# Patient Record
Sex: FEMALE | Race: WHITE | Employment: UNEMPLOYED | URBAN - METROPOLITAN AREA
[De-identification: names, ages, dates, MRNs, and addresses within clinical notes are randomized per-mention and may not be internally consistent; named-entity substitution may affect disease eponyms.]

---

## 2021-12-06 ENCOUNTER — TRANSFERRED RECORDS (OUTPATIENT)
Dept: PHYSICAL THERAPY | Facility: CLINIC | Age: 61
End: 2021-12-06
Payer: COMMERCIAL

## 2021-12-23 ENCOUNTER — THERAPY VISIT (OUTPATIENT)
Dept: PHYSICAL THERAPY | Facility: CLINIC | Age: 61
End: 2021-12-23
Payer: COMMERCIAL

## 2021-12-23 DIAGNOSIS — M54.50 ACUTE BILATERAL LOW BACK PAIN WITHOUT SCIATICA: ICD-10-CM

## 2021-12-23 PROCEDURE — 97110 THERAPEUTIC EXERCISES: CPT | Mod: GP | Performed by: PHYSICAL THERAPIST

## 2021-12-23 PROCEDURE — 97161 PT EVAL LOW COMPLEX 20 MIN: CPT | Mod: GP | Performed by: PHYSICAL THERAPIST

## 2021-12-23 NOTE — LETTER
[facility]  [address] [address] [place] [id number]  [phone]    2021    Re: Deonna Khan   :   1960  MRN:  [id number]   REFERRING PHYSICIAN:   Janet HERNANDES [facility]    Date of Initial Evaluation:  2021  Visits:  Rxs Used: 1  Reason for Referral:  Acute bilateral low back pain without sciatica    EVALUATION SUMMARY    [facility] Initial Evaluation    Subjective:  Deonna Khan is a 61 year old female.    Patient s chief complaints: Low Back Pain.    Condition occurred due to gradual onset noting pain with prolonged standing.  Date of Onset: pain worsened about 3 months ago, around 21  Location of symptoms is central and B low back pain without radiation .  Symptoms other than pain include: denies change in numbness/tingling/weakness; constipation at times, unsure if related   Quality of pain is aching and frequency is intermittent.    Pain dependence on time of day is: not dependent on time of day.   Pain rating is: 2/10.    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    Symptoms are relieved by:  sitting.    Progression of symptoms is that symptoms are:  unchanged.  Imaging/Special tests include: MRI next week, pending   Previous treatments include: none, had surgery and PT in more distant past .   Patient reports that general health is: good.   Pertinent medical history includes:  Diabetes II, osteopenia.  Sinus infection, oral sore.  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    LUMBAR:  Posture: fair  Posture Correction: better with upright posture  Relevant Lateral Shift: none   Deonna FARAH [facility]   :   1960 Pg 2    Neurological:    Motor Deficit:  Myotomes L R   L1-2 (hip flexion) 5 5   L3 (knee extension) 5 5   L4 (ankle DF) 5 5   L5 (g. toe ext) 5 5   S1 (ankle PF or knee flex) 5 5     Sensory Deficit, Reflexes: intact light touch screen B LE dermatomes    Dural Signs:   L R   Slump negative negative   SLR negative negative       AROM: (Major, Moderate, Minimal or Nil loss)  Movement Loss Conrado Mod Min Nil Pain   Flexion   X  To ankles, just stiff   Extension   X  Just stiff   Side Gliding L    X No effect   Side Gliding R    X No effect     Repeated movement testing:   (During: produces, abolishes, increases, decreases, no effect, centralizing, peripheralizing; After: better, worse, no better, no worse, no effect, centralized, peripheralized)    Pre-test Symptoms Standing: no pain   Symptoms During Symptoms After ROM increased ROM decreased No Effect   FIS        Rep FIS No effect No effect   X   EIS        Rep EIS No effect No effect   X   Pre-test Symptoms Lying: no pain    Symptoms During Symptoms After ROM increased ROM decreased No Effect   SYLVIE        Rep SYLVIE No effect No effect   X   EIL        Rep EIL No effect No effect   X     Static Tests: prone lying without pain, GWEN x 2 minutes without pain.  Other Tests: hip PROM and FADIR/PORTIA negative for pain both sides.  Negative SIJ compression/distraction, thigh thrust.      Deonna FARAH [facility]   :   1960 Pg 3    Provisional Classification: other, mechanically inconclusive.  Will trial flexion exercises secondary to complaint with prolonged standing and history of stenosis pre surgery.  Will have MRI upcoming, but will test for symptom response with repeated flexion  Principle of Management: flexion in sitting or lying 5 x 10 seconds every 2-3 hours 3-4x/day  or more; neutral spine core strength.  Will monitor response and progress core and LE strength as indicated.    Assessment/Plan:    Patient is a 61 year old female with lumbar complaints.    Patient has the following significant findings with corresponding treatment plan.                Diagnosis 1:  Low Back Pain    Pain -  hot/cold therapy, US, electric stimulation, manual therapy and directional preference exercise  Decreased ROM/flexibility - manual therapy and therapeutic exercise  Decreased strength - therapeutic exercise and therapeutic activities  Impaired balance - neuro re-education and therapeutic activities  Decreased proprioception - neuro re-education and therapeutic activities  Edema - electric stimulation and cold therapy  Impaired gait - gait training  Decreased function - therapeutic activities  Impaired posture - neuro re-education    Therapy Evaluation Codes:   1) History comprised of:   Personal factors that impact the plan of care:      None.    Comorbidity factors that impact the plan of care are:      None.     Medications impacting care: None.  2) Examination of Body Systems comprised of:   Body structures and functions that impact the plan of care:      Lumbar spine.   Activity limitations that impact the plan of care are:      Standing.  3) Clinical presentation characteristics are:   Stable/Uncomplicated.  4) Decision-Making    Low complexity using standardized patient assessment instrument and/or measureable assessment of functional outcome.  Cumulative Therapy Evaluation is: Low complexity.    Previous and current functional limitations:  (See Goal Flow Sheet for this information)    Short term and Long term goals: (See Goal Flow Sheet for this information)     Communication ability:  Patient appears to be able to clearly communicate and understand verbal and written communication and follow directions correctly.  Treatment Explanation - The following has been discussed with the  patient:   RX ordered/plan of care  Anticipated outcomes  Possible risks and side effects   Deonna Khan   :   1960 Pg 4    This patient would benefit from PT intervention to resume normal activities.   Rehab potential is good.    Frequency:  1 X week, once daily  Duration:  for 8 weeks  Discharge Plan:  Achieve all LTG.  Independent in home treatment program.  Reach maximal therapeutic benefit.    Thank you for your referral.    INQUIRIES  Therapist: Odilon Coe, DPT, SCS, Cert. MDT  [address] [address]  Phone: [phone]  Fax: [phone]

## 2021-12-23 NOTE — PROGRESS NOTES
[facility] Initial Evaluation    Subjective:  Deonna Khan is a 61 year old female.    Patient s chief complaints: Low Back Pain.    Condition occurred due to gradual onset noting pain with prolonged standing.  Date of Onset: pain worsened about 3 months ago, around 9/1/21  Location of symptoms is central and B low back pain without radiation .  Symptoms other than pain include: denies change in numbness/tingling/weakness; constipation at times, unsure if related   Quality of pain is aching and frequency is intermittent.    Pain dependence on time of day is: not dependent on time of day.   Pain rating is: 2/10.    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    Symptoms are relieved by:  sitting.    Progression of symptoms is that symptoms are:  unchanged.    Imaging/Special tests include: MRI next week, pending     Previous treatments include: none, had surgery and PT in more distant past 2016.   Patient reports that general health is: good.     Pertinent medical history includes:  Diabetes II, osteopenia.  Sinus infection, oral sore.    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    LUMBAR:    Posture: fair  Posture Correction: better with upright posture  Relevant Lateral Shift: none    Neurological:    Motor Deficit:  Myotomes L R   L1-2 (hip flexion) 5 5   L3 (knee extension) 5 5   L4 (ankle DF) 5 5   L5 (g. toe ext) 5 5   S1 (ankle PF or knee flex) 5 5     Sensory Deficit, Reflexes: intact light touch screen B LE dermatomes    Dural Signs:   L  R   Slump negative negative   SLR negative negative       AROM: (Major, Moderate, Minimal or Nil loss)  Movement Loss Conrado Mod Min Nil Pain   Flexion   X  To ankles, just stiff   Extension   X  Just stiff   Side Gliding L    X No effect   Side Gliding R    X No effect     Repeated movement testing:   (During: produces, abolishes, increases, decreases, no effect, centralizing, peripheralizing; After: better, worse, no better, no worse, no effect, centralized, peripheralized)    Pre-test Symptoms Standing: no pain   Symptoms During Symptoms After ROM increased ROM decreased No Effect   FIS        Rep FIS No effect No effect   X   EIS        Rep EIS No effect No effect   X   Pre-test Symptoms Lying: no pain    Symptoms During Symptoms After ROM increased ROM decreased No Effect   SYLVIE        Rep SYLVIE No effect No effect   X   EIL        Rep EIL No effect No effect   X     Static Tests: prone lying without pain, GWEN x 2 minutes without pain.  Other Tests: hip PROM and FADIR/PORTIA negative for pain both sides.  Negative SIJ compression/distraction, thigh thrust.     Provisional Classification: other, mechanically inconclusive.  Will trial flexion exercises secondary to complaint with prolonged standing and history of stenosis pre surgery.  Will have MRI upcoming, but will test for symptom response with repeated flexion  Principle of Management: flexion in sitting or lying 5 x 10 seconds every 2-3 hours 3-4x/day or more; neutral spine core strength.  Will monitor response and progress core and LE strength as indicated.    Assessment/Plan:    Patient is a 61 year old female with lumbar complaints.    Patient has the following significant findings with corresponding treatment plan.                Diagnosis 1:  Low Back Pain    Pain -  hot/cold therapy, US, electric stimulation, manual therapy and directional preference exercise  Decreased ROM/flexibility - manual therapy and therapeutic exercise  Decreased strength -  therapeutic exercise and therapeutic activities  Impaired balance - neuro re-education and therapeutic activities  Decreased proprioception - neuro re-education and therapeutic activities  Edema - electric stimulation and cold therapy  Impaired gait - gait training  Decreased function - therapeutic activities  Impaired posture - neuro re-education    Therapy Evaluation Codes:   1) History comprised of:   Personal factors that impact the plan of care:      None.    Comorbidity factors that impact the plan of care are:      None.     Medications impacting care: None.  2) Examination of Body Systems comprised of:   Body structures and functions that impact the plan of care:      Lumbar spine.   Activity limitations that impact the plan of care are:      Standing.  3) Clinical presentation characteristics are:   Stable/Uncomplicated.  4) Decision-Making    Low complexity using standardized patient assessment instrument and/or measureable assessment of functional outcome.  Cumulative Therapy Evaluation is: Low complexity.    Previous and current functional limitations:  (See Goal Flow Sheet for this information)    Short term and Long term goals: (See Goal Flow Sheet for this information)     Communication ability:  Patient appears to be able to clearly communicate and understand verbal and written communication and follow directions correctly.  Treatment Explanation - The following has been discussed with the patient:   RX ordered/plan of care  Anticipated outcomes  Possible risks and side effects  This patient would benefit from PT intervention to resume normal activities.   Rehab potential is good.    Frequency:  1 X week, once daily  Duration:  for 8 weeks  Discharge Plan:  Achieve all LTG.  Independent in home treatment program.  Reach maximal therapeutic benefit.    Please refer to the daily flowsheet for treatment today, total treatment time and time spent performing 1:1 timed codes.

## 2022-01-17 ENCOUNTER — THERAPY VISIT (OUTPATIENT)
Dept: PHYSICAL THERAPY | Facility: CLINIC | Age: 62
End: 2022-01-17
Payer: COMMERCIAL

## 2022-01-17 DIAGNOSIS — M54.50 ACUTE BILATERAL LOW BACK PAIN WITHOUT SCIATICA: ICD-10-CM

## 2022-01-17 PROCEDURE — 97530 THERAPEUTIC ACTIVITIES: CPT | Mod: GP | Performed by: PHYSICAL THERAPIST

## 2022-01-17 PROCEDURE — 97110 THERAPEUTIC EXERCISES: CPT | Mod: GP | Performed by: PHYSICAL THERAPIST

## 2022-01-17 PROCEDURE — 97112 NEUROMUSCULAR REEDUCATION: CPT | Mod: GP | Performed by: PHYSICAL THERAPIST

## 2022-03-10 ENCOUNTER — THERAPY VISIT (OUTPATIENT)
Dept: PHYSICAL THERAPY | Facility: CLINIC | Age: 62
End: 2022-03-10
Payer: COMMERCIAL

## 2022-03-10 DIAGNOSIS — M54.50 ACUTE BILATERAL LOW BACK PAIN WITHOUT SCIATICA: ICD-10-CM

## 2022-03-10 PROCEDURE — 97112 NEUROMUSCULAR REEDUCATION: CPT | Mod: GP | Performed by: PHYSICAL THERAPIST

## 2022-03-10 PROCEDURE — 97110 THERAPEUTIC EXERCISES: CPT | Mod: GP | Performed by: PHYSICAL THERAPIST

## 2022-03-10 PROCEDURE — 97530 THERAPEUTIC ACTIVITIES: CPT | Mod: GP | Performed by: PHYSICAL THERAPIST

## 2022-03-10 NOTE — LETTER
[facility]  [address] [address] [place] [id number]  [phone]    March 10, 2022    Re: Deonna Khan   :   1960  MRN:  [id number]   REFERRING PHYSICIAN:   Janet HERNANDES [facility]  Date of Initial Evaluation:  2021  Visits:  Rxs Used: 3  Reason for Referral:  Acute bilateral low back pain without sciatica    PROGRESS  REPORT  Progress reporting period is from 21 to 3/10/22.       SUBJECTIVE  Subjective changes noted by patient:  Overall, feeling better.  C/c is LBP with prolonged standing (>2 hours).     Current pain level is 0-2/10.    Initial Pain level: 2/10.   Changes in function:  Yes (See Goal flowsheet attached for changes in current functional level).  Adverse reaction to treatment or activity: None    OBJECTIVE  -umbar AROM normal and pain free  -No radicular sx reported  -Pt is walking 30 min, 3x/week, doing low impact exercise classes 4x/week, and her HEP 3x/week  -HEP is also focusing on posture and body mechanics    ASSESSMENT/PLAN  Updated problem list and treatment plan: Diagnosis 1:  LBP  Pain -  hot/cold therapy and home program  Decreased ROM/flexibility - therapeutic exercise  Decreased strength - therapeutic exercise and therapeutic activities  Decreased proprioception - neuro re-education and therapeutic activities  Inflammation - cold therapy and self management/home program  Impaired muscle performance - neuro re-education  Decreased function - therapeutic activities  Impaired posture - neuro re-education  STG/LTGs have been met or progress has been made towards goals:  Yes (See Goal flow sheet completed today.)  Assessment of Progress: The patient's condition is improving.  Self Management Plans:  Patient is independent in a home treatment program.  Patient is independent in self management of symptoms.  I have re-evaluated this patient and find that the nature, scope,  duration and intensity of the therapy is appropriate for the medical condition of the patient.  Deonna continues to require the following intervention to meet STG and LTG's:  PT intervention is no longer required to meet STG/LTG.      Recommendations:  This patient is ready to be discharged from therapy and continue their home treatment program.    Thank you for your referral.    INQUIRIES  Therapist: Faith Cerda PT, [address] [address]  Phone: [phone]  Fax: [phone]

## 2022-03-10 NOTE — PROGRESS NOTES
Subjective:  HPI  Physical Exam                    Objective:  System    Physical Exam    General     ROS    Assessment/Plan:    PROGRESS  REPORT    Progress reporting period is from 12/23/21 to 3/10/22.       SUBJECTIVE  Subjective changes noted by patient:  Overall, feeling better.  C/c is LBP with prolonged standing (>2 hours).       Current pain level is 0-2/10  .      Initial Pain level: 2/10.   Changes in function:  Yes (See Goal flowsheet attached for changes in current functional level)  Adverse reaction to treatment or activity: None    OBJECTIVE  -umbar AROM normal and pain free  -No radicular sx reported  -Pt is walking 30 min, 3x/week, doing low impact exercise classes 4x/week, and her HEP 3x/week  -HEP is also focusing on posture and body mechanics          ASSESSMENT/PLAN  Updated problem list and treatment plan: Diagnosis 1:  LBP  Pain -  hot/cold therapy and home program  Decreased ROM/flexibility - therapeutic exercise  Decreased strength - therapeutic exercise and therapeutic activities  Decreased proprioception - neuro re-education and therapeutic activities  Inflammation - cold therapy and self management/home program  Impaired muscle performance - neuro re-education  Decreased function - therapeutic activities  Impaired posture - neuro re-education  STG/LTGs have been met or progress has been made towards goals:  Yes (See Goal flow sheet completed today.)  Assessment of Progress: The patient's condition is improving.  Self Management Plans:  Patient is independent in a home treatment program.  Patient is independent in self management of symptoms.  I have re-evaluated this patient and find that the nature, scope, duration and intensity of the therapy is appropriate for the medical condition of the patient.  Deonna continues to require the following intervention to meet STG and LTG's:  PT intervention is no longer required to meet STG/LTG.    Recommendations:  This patient is ready to be  discharged from therapy and continue their home treatment program.    Please refer to the daily flowsheet for treatment today, total treatment time and time spent performing 1:1 timed codes.

## 2024-11-26 ENCOUNTER — APPOINTMENT (OUTPATIENT)
Dept: URBAN - METROPOLITAN AREA CLINIC 259 | Age: 64
Setting detail: DERMATOLOGY
End: 2024-11-26

## 2024-11-26 DIAGNOSIS — L57.8 OTHER SKIN CHANGES DUE TO CHRONIC EXPOSURE TO NONIONIZING RADIATION: ICD-10-CM

## 2024-11-26 DIAGNOSIS — D18.0 HEMANGIOMA: ICD-10-CM

## 2024-11-26 DIAGNOSIS — Z71.89 OTHER SPECIFIED COUNSELING: ICD-10-CM

## 2024-11-26 DIAGNOSIS — L82.1 OTHER SEBORRHEIC KERATOSIS: ICD-10-CM

## 2024-11-26 DIAGNOSIS — D22 MELANOCYTIC NEVI: ICD-10-CM

## 2024-11-26 DIAGNOSIS — L73.8 OTHER SPECIFIED FOLLICULAR DISORDERS: ICD-10-CM

## 2024-11-26 DIAGNOSIS — L81.4 OTHER MELANIN HYPERPIGMENTATION: ICD-10-CM

## 2024-11-26 PROBLEM — D22.5 MELANOCYTIC NEVI OF TRUNK: Status: ACTIVE | Noted: 2024-11-26

## 2024-11-26 PROBLEM — D18.01 HEMANGIOMA OF SKIN AND SUBCUTANEOUS TISSUE: Status: ACTIVE | Noted: 2024-11-26

## 2024-11-26 PROCEDURE — OTHER MIPS QUALITY: OTHER

## 2024-11-26 PROCEDURE — 99203 OFFICE O/P NEW LOW 30 MIN: CPT

## 2024-11-26 PROCEDURE — OTHER COUNSELING: OTHER

## 2024-11-26 ASSESSMENT — LOCATION DETAILED DESCRIPTION DERM
LOCATION DETAILED: LEFT SUPERIOR MEDIAL UPPER BACK
LOCATION DETAILED: LEFT MEDIAL UPPER BACK
LOCATION DETAILED: LEFT INFERIOR CENTRAL MALAR CHEEK
LOCATION DETAILED: RIGHT INFERIOR CENTRAL MALAR CHEEK

## 2024-11-26 ASSESSMENT — LOCATION SIMPLE DESCRIPTION DERM
LOCATION SIMPLE: LEFT UPPER BACK
LOCATION SIMPLE: RIGHT CHEEK
LOCATION SIMPLE: LEFT CHEEK

## 2024-11-26 ASSESSMENT — LOCATION ZONE DERM
LOCATION ZONE: TRUNK
LOCATION ZONE: FACE

## 2024-11-26 NOTE — HPI: FULL BODY SKIN EXAMINATION
How Severe Are Your Spot(S)?: mild
What Type Of Note Output Would You Prefer (Optional)?: Standard Output
What Is The Reason For Today's Visit?: Full Body Skin Examination
What Is The Reason For Today's Visit? (Being Monitored For X): concerning skin lesions on an annual basis
Additional History: Patient reports concern spot underneath right breast that was itchy, but now is not. She doesn’t know how long the spot has been there. She is also concerned about hair loss on both of her forearms.